# Patient Record
Sex: MALE | Race: WHITE | Employment: OTHER | URBAN - NONMETROPOLITAN AREA
[De-identification: names, ages, dates, MRNs, and addresses within clinical notes are randomized per-mention and may not be internally consistent; named-entity substitution may affect disease eponyms.]

---

## 2018-11-26 ENCOUNTER — ANESTHESIA (OUTPATIENT)
Dept: ENDOSCOPY | Age: 38
End: 2018-11-26
Payer: COMMERCIAL

## 2018-11-26 ENCOUNTER — ANESTHESIA EVENT (OUTPATIENT)
Dept: ENDOSCOPY | Age: 38
End: 2018-11-26
Payer: COMMERCIAL

## 2018-11-26 ENCOUNTER — HOSPITAL ENCOUNTER (OUTPATIENT)
Age: 38
Setting detail: OUTPATIENT SURGERY
Discharge: HOME | End: 2018-11-26
Attending: INTERNAL MEDICINE | Admitting: INTERNAL MEDICINE
Payer: COMMERCIAL

## 2018-11-26 VITALS
BODY MASS INDEX: 27.2 KG/M2 | TEMPERATURE: 96.6 F | SYSTOLIC BLOOD PRESSURE: 120 MMHG | OXYGEN SATURATION: 100 % | HEIGHT: 72 IN | RESPIRATION RATE: 18 BRPM | DIASTOLIC BLOOD PRESSURE: 73 MMHG | HEART RATE: 59 BPM | WEIGHT: 200.8 LBS

## 2018-11-26 VITALS
RESPIRATION RATE: 23 BRPM | OXYGEN SATURATION: 100 % | DIASTOLIC BLOOD PRESSURE: 60 MMHG | SYSTOLIC BLOOD PRESSURE: 101 MMHG

## 2018-11-26 PROCEDURE — 3609012700 HC EGD DILATION SAVORY: Performed by: INTERNAL MEDICINE

## 2018-11-26 PROCEDURE — 7100000001 HC PACU RECOVERY - ADDTL 15 MIN: Performed by: INTERNAL MEDICINE

## 2018-11-26 PROCEDURE — 2580000003 HC RX 258: Performed by: INTERNAL MEDICINE

## 2018-11-26 PROCEDURE — 3609010300 HC COLONOSCOPY W/BIOPSY SINGLE/MULTIPLE: Performed by: INTERNAL MEDICINE

## 2018-11-26 PROCEDURE — 7100000000 HC PACU RECOVERY - FIRST 15 MIN: Performed by: INTERNAL MEDICINE

## 2018-11-26 PROCEDURE — 3700000000 HC ANESTHESIA ATTENDED CARE: Performed by: INTERNAL MEDICINE

## 2018-11-26 PROCEDURE — 3609012400 HC EGD TRANSORAL BIOPSY SINGLE/MULTIPLE: Performed by: INTERNAL MEDICINE

## 2018-11-26 PROCEDURE — 6360000002 HC RX W HCPCS: Performed by: ANESTHESIOLOGY

## 2018-11-26 PROCEDURE — 88305 TISSUE EXAM BY PATHOLOGIST: CPT

## 2018-11-26 PROCEDURE — 2500000003 HC RX 250 WO HCPCS: Performed by: ANESTHESIOLOGY

## 2018-11-26 PROCEDURE — 3700000001 HC ADD 15 MINUTES (ANESTHESIA): Performed by: INTERNAL MEDICINE

## 2018-11-26 PROCEDURE — 2709999900 HC NON-CHARGEABLE SUPPLY: Performed by: INTERNAL MEDICINE

## 2018-11-26 RX ORDER — SODIUM CHLORIDE 450 MG/100ML
INJECTION, SOLUTION INTRAVENOUS CONTINUOUS
Status: DISCONTINUED | OUTPATIENT
Start: 2018-11-26 | End: 2018-11-26 | Stop reason: HOSPADM

## 2018-11-26 RX ORDER — LIDOCAINE HYDROCHLORIDE 20 MG/ML
INJECTION, SOLUTION EPIDURAL; INFILTRATION; INTRACAUDAL; PERINEURAL PRN
Status: DISCONTINUED | OUTPATIENT
Start: 2018-11-26 | End: 2018-11-26 | Stop reason: SDUPTHER

## 2018-11-26 RX ORDER — PROPOFOL 10 MG/ML
INJECTION, EMULSION INTRAVENOUS PRN
Status: DISCONTINUED | OUTPATIENT
Start: 2018-11-26 | End: 2018-11-26 | Stop reason: SDUPTHER

## 2018-11-26 RX ADMIN — PROPOFOL 100 MG: 10 INJECTION, EMULSION INTRAVENOUS at 12:08

## 2018-11-26 RX ADMIN — PROPOFOL 70 MG: 10 INJECTION, EMULSION INTRAVENOUS at 12:03

## 2018-11-26 RX ADMIN — PROPOFOL 50 MG: 10 INJECTION, EMULSION INTRAVENOUS at 11:53

## 2018-11-26 RX ADMIN — LIDOCAINE HYDROCHLORIDE 80 MG: 20 INJECTION, SOLUTION EPIDURAL; INFILTRATION; INTRACAUDAL; PERINEURAL at 11:53

## 2018-11-26 RX ADMIN — PROPOFOL 50 MG: 10 INJECTION, EMULSION INTRAVENOUS at 12:10

## 2018-11-26 RX ADMIN — PROPOFOL 50 MG: 10 INJECTION, EMULSION INTRAVENOUS at 12:15

## 2018-11-26 RX ADMIN — PROPOFOL 50 MG: 10 INJECTION, EMULSION INTRAVENOUS at 11:58

## 2018-11-26 RX ADMIN — SODIUM CHLORIDE: 4.5 INJECTION, SOLUTION INTRAVENOUS at 11:12

## 2018-11-26 RX ADMIN — PROPOFOL 50 MG: 10 INJECTION, EMULSION INTRAVENOUS at 11:54

## 2018-11-26 RX ADMIN — PROPOFOL 50 MG: 10 INJECTION, EMULSION INTRAVENOUS at 11:55

## 2018-11-26 RX ADMIN — PROPOFOL 30 MG: 10 INJECTION, EMULSION INTRAVENOUS at 12:05

## 2018-11-26 ASSESSMENT — PAIN - FUNCTIONAL ASSESSMENT: PAIN_FUNCTIONAL_ASSESSMENT: 0-10

## 2018-11-26 ASSESSMENT — PAIN SCALES - GENERAL
PAINLEVEL_OUTOF10: 0
PAINLEVEL_OUTOF10: 0

## 2018-11-26 NOTE — BRIEF OP NOTE
Brief Postoperative Note  ______________________________________________________________    Patient: Shalom Cousin  YOB: 1980  MRN: [id number]  Date of Procedure: 11/26/2018    Pre-Op Diagnosis: GASTROESOPHAGEAL REFLUX DISEASE, PHARYNGOESOPHAGEAL DYSPHAGIA, CHANGE IN BOWEL HABITS, FUNCTIONAL DIARRHEA    Post-Op Diagnosis: esophageal stricture, dilation ,  GERD and normal colonoscopy         Procedure(s):  EGD DIL  COLONOSCOPY    Anesthesia: Monitor Anesthesia Care    Surgeon(s):  Hafsa Guzman MD    Complications: None    Specimens:   ID Type Source Tests Collected by Time Destination   A : biopsies distal esophagus r/o eosinophilic esophagitis Tissue Esophagus SURGICAL PATHOLOGY Hafsa Guzman MD 11/26/2018 1206    B : biopsies terminal ilium h/o diarrhea Tissue Ileum SURGICAL PATHOLOGY Hafsa Guzman MD 11/26/2018 1225    C : biopsies right colon and transverse colon h/o diarrhea Tissue Colon SURGICAL PATHOLOGY Hafsa Guzman MD 11/26/2018 1227    D : biopsies left colon h/o diarrhea Tissue Colon SURGICAL PATHOLOGY Hafsa Guzman MD 11/26/2018 1228        Findings:   esophageal stricture, dilation ,  GERD and normal colonoscopy        Hafsa Guzman MD  Date: 11/26/2018  Time: 12:33 PM

## 2018-11-26 NOTE — PROGRESS NOTES
EGD completed, photos taken, biopsies taken and one jar sent to lab. , American dilation with #48, 51, 54. Pt tolerated well. Colonoscopy started.

## 2018-11-27 NOTE — PROCEDURES
[address] [id number]                                 PROCEDURE NOTE    PATIENT NAME: Amy Sterling                     :        1980  MED REC NO:   [id number]                           ROOM:  ACCOUNT NO:   [de-identified]                           ADMIT DATE: 2018  PROVIDER:     ALEXX Gray Johnny OF PROCEDURE:  2018    INDICATIONS:  The patient with dysphagia and gastric reflux, which has  improved with treatment with PPI, however, not so completely. Also, has  chronic diarrhea, going on for years. Plan today for EGD with possible  dilation as well as colonoscopy to evaluate. SURGEON:  Helga Pena M.D. ASA CLASSIFICATION:  I.    DESCRIPTION OF PROCEDURE:  The patient was brought to the GI lab. Consent was obtained. The risks involved with the procedure were  explained to the patient. Informed consent was obtained. The patient  was monitored during the procedure with pulse oximetry, blood pressure  monitoring, and oxygen by nasal cannula. Sedation by incremental doses  of IV propofol given by Anesthesia Service to achieve total anesthesia. For ASA classification and medication given during the procedure, please  see Anesthesia note. Two procedures were performed. PROCEDURE #1:  EGD with biopsy and dilation up to size 54-Russian. A standard video 190 Olympus upper scope was advanced under direct  vision from the oral cavity up to the duodenum. Esophagus featured  eosinophilic esophagitis with rigid-like and fissure-like extending to  mid esophagus and distal esophagus as well as Schatzki's ring in distal  esophagus. Gastroesophageal junction was at 39 cm from the incisors. Scope was advanced to the stomach and retroflexed. Examination of the  cardia revealed small non-clinically significant hiatus hernia.   Antrum  appears normal.  Pylorus appears normal.  Duodenum appears normal.

## 2020-11-18 ENCOUNTER — HOSPITAL ENCOUNTER (EMERGENCY)
Age: 40
Discharge: HOME | End: 2020-11-18
Payer: COMMERCIAL

## 2020-11-18 VITALS
BODY MASS INDEX: 28 KG/M2 | SYSTOLIC BLOOD PRESSURE: 139 MMHG | WEIGHT: 200 LBS | TEMPERATURE: 97.8 F | HEART RATE: 66 BPM | DIASTOLIC BLOOD PRESSURE: 72 MMHG | HEIGHT: 71 IN | OXYGEN SATURATION: 97 % | RESPIRATION RATE: 14 BRPM

## 2020-11-18 LAB
EKG ATRIAL RATE: 63 BPM
EKG P AXIS: 62 DEGREES
EKG P-R INTERVAL: 174 MS
EKG Q-T INTERVAL: 382 MS
EKG QRS DURATION: 102 MS
EKG QTC CALCULATION (BAZETT): 390 MS
EKG R AXIS: 19 DEGREES
EKG T AXIS: 27 DEGREES
EKG VENTRICULAR RATE: 63 BPM

## 2020-11-18 PROCEDURE — 93005 ELECTROCARDIOGRAM TRACING: CPT | Performed by: NURSE PRACTITIONER

## 2020-11-18 PROCEDURE — 99203 OFFICE O/P NEW LOW 30 MIN: CPT | Performed by: NURSE PRACTITIONER

## 2020-11-18 PROCEDURE — 99215 OFFICE O/P EST HI 40 MIN: CPT

## 2020-11-18 ASSESSMENT — ENCOUNTER SYMPTOMS
NAUSEA: 0
SHORTNESS OF BREATH: 0
CHEST TIGHTNESS: 0
VOMITING: 0

## 2020-11-18 NOTE — ED PROVIDER NOTES
[address]  Urgent Care Encounter       CHIEF COMPLAINT       Chief Complaint   Patient presents with    Chest Pain       Nurses Notes reviewed and I agree except as noted in the HPI. HISTORY OF PRESENT ILLNESS   Kimberly López is a 45 y.o. male who presents with complaints of the left lower chest pain, onset yesterday. Patient reports pain, described as a pressure, to the left lower chest that started yesterday. Pain was rated a 2 out of 10 patient states he would not be worried about it except for the fact that it is around his chest.  He has had this pain before off and on about every 2 to 3 months and it lasts for approximately 5 minutes. He was concerned this time because the pain is been fairly consistent since onset. 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  Does not recall any specific injury to the area. He denies any heart history. He does report he had an uncle who had a heart attack in his late 35s or early 45s. No associated shortness of breath, nausea, dizziness/lightheadedness or diaphoresis associated with the pain. The history is provided by the patient. REVIEW OF SYSTEMS     Review of Systems   Constitutional: Negative for diaphoresis, fatigue and fever. Respiratory: Negative for chest tightness and shortness of breath. Cardiovascular: Positive for chest pain. Negative for palpitations and leg swelling. Gastrointestinal: Negative for nausea and vomiting. Neurological: Negative for dizziness and light-headedness. PAST MEDICAL HISTORY         Diagnosis Date    Scarlet fever 1989       SURGICALHISTORY     Patient  has a past surgical history that includes Colonoscopy; Upper gastrointestinal endoscopy; Upper gastrointestinal endoscopy (Left, 11/26/2018); Colonoscopy (11/26/2018); Upper gastrointestinal endoscopy (11/26/2018); and EGD (2018).     CURRENT MEDICATIONS       Discharge Medication List as of 11/18/2020  4:05 PM      CONTINUE these medications which have NOT CHANGED    Details   omeprazole (PRILOSEC) 20 MG delayed release capsule Take 20 mg by mouth dailyHistorical Med             ALLERGIES     Patient is has No Known Allergies. Patients   Immunization History   Administered Date(s) Administered    Tdap (Boostrix, Adacel) 10/07/2016       FAMILY HISTORY     Patient's family history includes No Known Problems in his father, mother, sister, and sister. SOCIAL HISTORY     Patient  reports that he has never smoked. He has never used smokeless tobacco. He reports that he does not drink alcohol or use drugs. PHYSICAL EXAM     ED TRIAGE VITALS  BP: 139/72, Temp: 97.8 °F (36.6 °C), Pulse: 66, Resp: 14, SpO2: 97 %,Estimated body mass index is 27.89 kg/m² as calculated from the following:    Height as of this encounter: 5' 11\" (1.803 m). Weight as of this encounter: 200 lb (90.7 kg). ,No LMP for male patient. Physical Exam  Vitals signs and nursing note reviewed. Constitutional:       General: He is not in acute distress. Appearance: He is well-developed. HENT:      Head: Normocephalic and atraumatic. Cardiovascular:      Rate and Rhythm: Normal rate and regular rhythm. Heart sounds: Normal heart sounds, S1 normal and S2 normal. No murmur. Pulmonary:      Effort: Pulmonary effort is normal. No respiratory distress. Breath sounds: Normal breath sounds and air entry. Chest:      Chest wall: Tenderness (Left lower chest below the nipple line with mild tenderness. Patient states the pain is deeper inside.) present. Musculoskeletal:      Right lower leg: No edema. Left lower leg: No edema. Skin:     General: Skin is warm and dry. Neurological:      General: No focal deficit present. Mental Status: He is alert and oriented to person, place, and time. Psychiatric:         Mood and Affect: Mood normal.         Speech: Speech normal.         Behavior: Behavior normal. Behavior is cooperative. DIAGNOSTIC RESULTS     Labs:No results found for this visit on 11/18/20. IMAGING:    No orders to display         EKG: Reviewed by this provider. Sinus arrhythmia with a rate of 60 bpm.  Normal intervals. No acute ST or T wave abnormalities noted. URGENT CARE COURSE:     Vitals:    11/18/20 1517   BP: 139/72   Pulse: 66   Resp: 14   Temp: 97.8 °F (36.6 °C)   TempSrc: Temporal   SpO2: 97%   Weight: 200 lb (90.7 kg)   Height: 5' 11\" (1.803 m)       Medications - No data to display         PROCEDURES:  None    FINAL IMPRESSION      1. Chest pain, unspecified type          DISPOSITION/ PLAN     Patient presents with chest pain. EKG completed which showed a sinus arrhythmia with a rate of 63 bpm and normal intervals. No acute ST or T wave abnormalities noted. The chest pain is below the nipple line and into the area of the ribs. Patient with no other associated symptoms or significant history. Suspicion for cardiac etiology is low however the pain could not be reproduced. Patient given the opportunity to transfer to the emergency department for further evaluation today but did not want to do that at this time. Recommend patient follow-up with cardiology for further evaluation. Cardiology appointment was made for Friday at 8:45 AM with Dr. Jame Whittington. Patient will discuss with his wife and if does not want to keep this appointment he will cancel. I recommend at least a follow-up with family doctor as soon as possible if he does not see cardiology. Discussed reasons to go to the emergency room with the patient who did verbalize understanding. Further instructions were outlined verbally and in the patient's discharge instructions. All the patient's questions were answered. The patient/parent agreed with the plan and was discharged from the [facility] in good condition. PATIENT REFERRED TO:  No primary care provider on file. No primary physician on file.       DISCHARGE MEDICATIONS:  Discharge Medication List as of 11/18/2020  4:05 PM          Discharge Medication List as of 11/18/2020  4:05 PM          Discharge Medication List as of 11/18/2020  4:05 PM          SADA Abreu CNP    (Please note that portions of this note were completed with a voice recognition program. Efforts were made to edit the dictations but occasionally words are mis-transcribed.)         SADA Abreu CNP  11/18/20 1024

## 2020-11-18 NOTE — ED TRIAGE NOTES
Patient states he has had left sided chest pain since last night. The Pain is been on and off but pressure over the heart area.

## 2022-01-11 NOTE — ED NOTES
PT GIVEN DISCHARGE INSTRUCTIONS, VERBALIZES UNDERSTANDING. PT ASSESSMENT UNCHANGED, DISCHARGED IN STABLE CONDITION.         Jeff Walter RN  11/18/20 5814 Contraindicated

## 2023-03-17 ENCOUNTER — HOSPITAL ENCOUNTER (EMERGENCY)
Age: 43
Discharge: HOME | End: 2023-03-17
Payer: COMMERCIAL

## 2023-03-17 ENCOUNTER — APPOINTMENT (OUTPATIENT)
Dept: GENERAL RADIOLOGY | Age: 43
End: 2023-03-17
Payer: COMMERCIAL

## 2023-03-17 VITALS
TEMPERATURE: 98 F | WEIGHT: 209.6 LBS | RESPIRATION RATE: 16 BRPM | DIASTOLIC BLOOD PRESSURE: 84 MMHG | SYSTOLIC BLOOD PRESSURE: 131 MMHG | HEART RATE: 58 BPM | BODY MASS INDEX: 29.23 KG/M2 | OXYGEN SATURATION: 100 %

## 2023-03-17 DIAGNOSIS — S62.501A CLOSED NONDISPLACED FRACTURE OF PHALANX OF RIGHT THUMB, UNSPECIFIED PHALANX, INITIAL ENCOUNTER: Primary | ICD-10-CM

## 2023-03-17 DIAGNOSIS — L03.011 CELLULITIS OF THUMB, RIGHT: ICD-10-CM

## 2023-03-17 PROCEDURE — 99213 OFFICE O/P EST LOW 20 MIN: CPT | Performed by: EMERGENCY MEDICINE

## 2023-03-17 PROCEDURE — 29130 APPL FINGER SPLINT STATIC: CPT

## 2023-03-17 PROCEDURE — 73140 X-RAY EXAM OF FINGER(S): CPT

## 2023-03-17 PROCEDURE — 99213 OFFICE O/P EST LOW 20 MIN: CPT

## 2023-03-17 PROCEDURE — 6370000000 HC RX 637 (ALT 250 FOR IP): Performed by: EMERGENCY MEDICINE

## 2023-03-17 RX ORDER — BACITRACIN ZINC 500 [USP'U]/G
OINTMENT TOPICAL ONCE
Status: COMPLETED | OUTPATIENT
Start: 2023-03-17 | End: 2023-03-17

## 2023-03-17 RX ORDER — CEPHALEXIN 500 MG/1
500 CAPSULE ORAL 3 TIMES DAILY
Qty: 21 CAPSULE | Refills: 0 | Status: SHIPPED | OUTPATIENT
Start: 2023-03-17 | End: 2023-03-24

## 2023-03-17 RX ADMIN — BACITRACIN ZINC: 500 OINTMENT TOPICAL at 18:24

## 2023-03-17 ASSESSMENT — PAIN DESCRIPTION - PAIN TYPE: TYPE: ACUTE PAIN

## 2023-03-17 ASSESSMENT — PAIN DESCRIPTION - DESCRIPTORS: DESCRIPTORS: ACHING

## 2023-03-17 ASSESSMENT — PAIN - FUNCTIONAL ASSESSMENT: PAIN_FUNCTIONAL_ASSESSMENT: 0-10

## 2023-03-17 ASSESSMENT — PAIN SCALES - GENERAL: PAINLEVEL_OUTOF10: 1

## 2023-03-17 ASSESSMENT — PAIN DESCRIPTION - LOCATION: LOCATION: OTHER (COMMENT)

## 2023-03-17 ASSESSMENT — PAIN DESCRIPTION - ORIENTATION: ORIENTATION: RIGHT

## 2023-03-17 NOTE — ED PROVIDER NOTES
[address]  Urgent Care Encounter       CHIEF COMPLAINT       Chief Complaint   Patient presents with    Hand Injury     Right thumb was smashed with car part 3/6/23. Pt states pain increased and patient lost thumb nail today and has noticed some discoloring of skin on thumb recently       Nurses Notes reviewed and I agree except as noted in the HPI. HISTORY OF PRESENT ILLNESS   Manisha Mercedes is a 40 y.o. male who presents for complaints of right thumb injury. Patient states that he smashed his thumb between a rubber wheel chock and a metal surface. This happened on March 6, 2023. He states that blood drained from his thumbnail initially. States that it swelled significantly over the next couple of days. He did poke a couple holes in the nail to try to help drain this but with little benefit. He states the cuticle area appeared to be swollen and infected. He did insert a needle into the area to try to drain. He states he did get a small amount of pus from the cuticle. Patient reports that the thumbnail fell off earlier today. He is concerned as the thumb appears red, swollen and has redness on the palmar aspect. He does have decreased range of motion of the thumb. No fevers. HPI    REVIEW OF SYSTEMS     Review of Systems   Constitutional:  Negative for activity change, fatigue and fever. Musculoskeletal:  Positive for arthralgias (right thumb). Skin:  Positive for wound (right thumb). PAST MEDICAL HISTORY         Diagnosis Date    Scarlet fever 1989       SURGICALHISTORY     Patient  has a past surgical history that includes Colonoscopy; Upper gastrointestinal endoscopy; Upper gastrointestinal endoscopy (Left, 11/26/2018); Colonoscopy (11/26/2018); Upper gastrointestinal endoscopy (11/26/2018); and EGD (2018).     CURRENT MEDICATIONS       Previous Medications    OMEPRAZOLE (PRILOSEC) 20 MG DELAYED RELEASE CAPSULE    Take 20 mg by mouth daily       ALLERGIES     Patient is has No Known Allergies. Patients   Immunization History   Administered Date(s) Administered    Tdap (Boostrix, Adacel) 10/07/2016       FAMILY HISTORY     Patient's family history includes No Known Problems in his father, mother, sister, and sister. SOCIAL HISTORY     Patient  reports that he has never smoked. He has never used smokeless tobacco. He reports that he does not drink alcohol and does not use drugs. PHYSICAL EXAM     ED TRIAGE VITALS  BP: 131/84, Temp: 98 °F (36.7 °C), Heart Rate: 58, Resp: 16, SpO2: 100 %,Estimated body mass index is 29.23 kg/m² as calculated from the following:    Height as of 11/18/20: 5' 11\" (1.803 m). Weight as of this encounter: 209 lb 9.6 oz (95.1 kg). ,No LMP for male patient. Physical Exam  Constitutional:       Appearance: He is normal weight. Cardiovascular:      Rate and Rhythm: Normal rate. Musculoskeletal:      Right hand: Tenderness and bony tenderness present. Decreased range of motion. Hands:       Comments: Right thumbnail absent. No open wound to the nail bed. No paronychia. There is erythema and swelling to the distal phalanx of the right thumb. Decreased range of motion   Skin:     General: Skin is warm and dry. Capillary Refill: Capillary refill takes less than 2 seconds. Neurological:      General: No focal deficit present. Mental Status: He is alert. Psychiatric:         Mood and Affect: Mood normal.       DIAGNOSTIC RESULTS     Labs:No results found for this visit on 03/17/23. IMAGING:    XR FINGER RIGHT (MIN 2 VIEWS)   Final Result   1. Small bony density adjacent to the base of the first distal phalanx which could represent a small evulsion fracture. Please correlate clinically. .               **This report has been created using voice recognition software. It may contain minor errors which are inherent in voice recognition technology. **      Final report electronically signed by DR Damien Roberto on 3/17/2023 6:04 PM            EKG:      URGENT CARE COURSE:     Vitals:    03/17/23 1711   BP: 131/84   Pulse: 58   Resp: 16   Temp: 98 °F (36.7 °C)   TempSrc: Temporal   SpO2: 100%   Weight: 209 lb 9.6 oz (95.1 kg)       Medications   bacitracin zinc ointment ( Topical Given 3/17/23 1824)            PROCEDURES:  None    FINAL IMPRESSION      1. Closed nondisplaced fracture of phalanx of right thumb, unspecified phalanx, initial encounter    2. Cellulitis of thumb, right          DISPOSITION/ PLAN   Patient presents for his likely cellulitis of the right thumb. There is also a questionable chip fracture at the IP joint of the right thumb. Patient is placed in AlumaFoam splint to immobilize the IP joint. I will place on Keflex. Advised to follow-up with orthopedics for any signs or symptoms of a significant pulp space infection. Return as needed. PATIENT REFERRED TO:  No primary care provider on file. No primary physician on file.       DISCHARGE MEDICATIONS:  New Prescriptions    CEPHALEXIN (KEFLEX) 500 MG CAPSULE    Take 1 capsule by mouth 3 times daily for 7 days       Discontinued Medications    No medications on file       Current Discharge Medication List          SADA Johnson CNP    (Please note that portions of this note were completed with a voice recognition program. Efforts were made to edit the dictations but occasionally words are mis-transcribed.)           SADA Johnson CNP  03/17/23 9032

## 2023-03-17 NOTE — ED NOTES
Right thumb cleansed with wound wash and patted dry. Antibiotic ointment applied and covered with non-adhesive dressing. Alumafoam thumb splint applied and secured with coban. Reviewed instructions and voiced understanding.       Fabiana Hill RN  03/17/23 9354

## 2023-03-17 NOTE — ED TRIAGE NOTES
Right thumb was smashed with car part 3/6/23.  Pt states pain increased and patient lost thumb nail today and has noticed some discoloring of skin on thumb recently

## 2023-03-17 NOTE — DISCHARGE INSTRUCTIONS
Keflex as directed until gone    Wear the splint until symptoms have improved or you are seen by orthopedics    Evaluated by orthopedics if there is increased swelling, redness or uncontrolled pain to your thumb. If you develop a fever.   Or any new concerns

## (undated) DEVICE — SET ADMIN 25ML L117IN PMP MOD CK VLV RLER CLMP 2 SMRTSITE

## (undated) DEVICE — CATHETER ETER IV 22GA L1IN POLYUR STR RADPQ INTROCAN SFTY

## (undated) DEVICE — SOLUTION IV 1000ML 0.45% SOD CHL PH 5 INJ USP VIAFLX PLAS

## (undated) DEVICE — ENDO KIT: Brand: MEDLINE INDUSTRIES, INC.

## (undated) DEVICE — CONNECTOR TBNG AUX H2O JET DISP FOR OLY 160/180 SER

## (undated) DEVICE — TUBING IV STOPCOCK 48 CM 3 W

## (undated) DEVICE — LINER SUCT CANSTR 1500CC SEMI RIG W/ POR HYDROPHOBIC SHUT

## (undated) DEVICE — FORCEP RAD JAW W/NEEDLE 160CM

## (undated) DEVICE — Device: Brand: DEFENDO VALVE AND CONNECTOR KIT

## (undated) DEVICE — FORCEPS BX L240CM JAW DIA3.2MM L CAP W/ NDL MIC MESH TOOTH

## (undated) DEVICE — CONMED SCOPE SAVER BITE BLOCK, 20X27 MM: Brand: SCOPE SAVER

## (undated) DEVICE — IV START KIT: Brand: MEDLINE INDUSTRIES, INC.